# Patient Record
Sex: MALE | Race: BLACK OR AFRICAN AMERICAN | NOT HISPANIC OR LATINO | URBAN - METROPOLITAN AREA
[De-identification: names, ages, dates, MRNs, and addresses within clinical notes are randomized per-mention and may not be internally consistent; named-entity substitution may affect disease eponyms.]

---

## 2019-09-04 ENCOUNTER — SURGERY - HEALTHEAST (OUTPATIENT)
Dept: SURGERY | Facility: HOSPITAL | Age: 49
End: 2019-09-04

## 2019-09-04 ENCOUNTER — ANESTHESIA - HEALTHEAST (OUTPATIENT)
Dept: SURGERY | Facility: HOSPITAL | Age: 49
End: 2019-09-04

## 2019-09-04 ASSESSMENT — MIFFLIN-ST. JEOR: SCORE: 1568.39

## 2021-06-01 NOTE — ANESTHESIA PREPROCEDURE EVALUATION
Anesthesia Evaluation      Patient summary reviewed   No history of anesthetic complications     Airway   Mallampati: II  Neck ROM: full   Pulmonary - normal exam    breath sounds clear to auscultation  (+) a smoker (former)                         Cardiovascular - negative ROS  Exercise tolerance: > or = 4 METS  (-) murmur  ECG reviewed (9/4/19: sinus tachy, 106 bpm)  Rhythm: regular  Rate: normal,    no murmur      Neuro/Psych - negative ROS     Endo/Other    (+) diabetes mellitus type 2,      Comments: HIV      GI/Hepatic/Renal - negative ROS      Other findings: Labs 9/4/19:  WBC 13.6, Hgb 12.8, Plt 274  Na 133, K 3.8, BUN 12, Cr 1.01, Glu 354      Dental - normal exam                        Anesthesia Plan  Planned anesthetic: general endotracheal  GETA.  Decadron (4 mg) and zofran for PONV ppx.  Ketorolac 30 mg at EOC pending surgeon approval.  ASA 2   Induction: intravenous   Anesthetic plan and risks discussed with: patient and spouse  Anesthesia plan special considerations: antiemetics,   Post-op plan: routine recovery

## 2021-06-01 NOTE — ANESTHESIA CARE TRANSFER NOTE
Last vitals:   Vitals:    09/04/19 1730   BP: 144/79   Pulse: (!) 104   Resp: 10   Temp: 37.3  C (99.1  F)   SpO2: 100%     Patient's level of consciousness is awake  Spontaneous respirations: yes  Maintains airway independently: yes  Dentition unchanged: yes  Oropharynx: oropharynx clear of all foreign objects    QCDR Measures:  ASA# 20 - Surgical Safety Checklist: WHO surgical safety checklist completed prior to induction    PQRS# 430 - Adult PONV Prevention: 4558F - Pt received => 2 anti-emetic agents (different classes) preop & intraop  ASA# 8 - Peds PONV Prevention: NA - Not pediatric patient, not GA or 2 or more risk factors NOT present  PQRS# 424 - Anne-Marie-op Temp Management: 4559F - At least one body temp DOCUMENTED => 35.5C or 95.9F within required timeframe  PQRS# 426 - PACU Transfer Protocol: - Transfer of care checklist used  ASA# 14 - Acute Post-op Pain: ASA14B - Patient did NOT experience pain >= 7 out of 10

## 2021-06-01 NOTE — ANESTHESIA POSTPROCEDURE EVALUATION
Patient: Kaiden Chery  INCISION AND DRAINAGE, ABSCESS, RECTAL OR PERIRECTAL  Anesthesia type: general    Patient location: PACU  Last vitals:   Vitals Value Taken Time   /61 9/4/2019  9:00 PM   Temp 36.9  C (98.5  F) 9/4/2019  9:00 PM   Pulse 112 9/4/2019  9:00 PM   Resp 18 9/4/2019  9:00 PM   SpO2 95 % 9/4/2019  9:00 PM     Post vital signs: stable  Level of consciousness: awake and responds to simple questions  Post-anesthesia pain: pain controlled  Post-anesthesia nausea and vomiting: no  Pulmonary: unassisted, return to baseline  Cardiovascular: stable and blood pressure at baseline  Hydration: adequate  Anesthetic events: no    QCDR Measures:  ASA# 11 - Anne-Marie-op Cardiac Arrest: ASA11B - Patient did NOT experience unanticipated cardiac arrest  ASA# 12 - [facility]-Marie-op Mortality Rate: ASA12B - Patient did NOT die  ASA# 13 - PACU Re-Intubation Rate: ASA13B - Patient did NOT require a new airway mgmt  ASA# 10 - Composite Anes Safety: ASA10A - No serious adverse event    Additional Notes:

## 2021-06-03 VITALS — BODY MASS INDEX: 25.02 KG/M2 | WEIGHT: 165.1 LBS | HEIGHT: 68 IN

## 2021-06-16 PROBLEM — K61.1 PERIRECTAL ABSCESS: Status: ACTIVE | Noted: 2019-09-04

## 2021-06-16 PROBLEM — Z21 ASYMPTOMATIC HUMAN IMMUNODEFICIENCY VIRUS (HIV) INFECTION STATUS (H): Status: ACTIVE | Noted: 2019-09-04

## 2021-06-16 PROBLEM — K61.1 PERI-RECTAL ABSCESS: Status: ACTIVE | Noted: 2019-09-04

## 2021-06-16 PROBLEM — E11.9 TYPE 2 DIABETES MELLITUS, WITHOUT LONG-TERM CURRENT USE OF INSULIN (H): Status: ACTIVE | Noted: 2019-09-04
